# Patient Record
Sex: FEMALE | Race: WHITE
[De-identification: names, ages, dates, MRNs, and addresses within clinical notes are randomized per-mention and may not be internally consistent; named-entity substitution may affect disease eponyms.]

---

## 2019-07-23 ENCOUNTER — HOSPITAL ENCOUNTER (EMERGENCY)
Dept: HOSPITAL 62 - ER | Age: 19
LOS: 1 days | Discharge: LEFT BEFORE DISCHARGE | End: 2019-07-24
Payer: SELF-PAY

## 2019-07-23 VITALS — DIASTOLIC BLOOD PRESSURE: 81 MMHG | SYSTOLIC BLOOD PRESSURE: 136 MMHG

## 2019-07-23 DIAGNOSIS — Z53.21: Primary | ICD-10-CM

## 2019-07-25 ENCOUNTER — HOSPITAL ENCOUNTER (EMERGENCY)
Dept: HOSPITAL 62 - ER | Age: 19
Discharge: HOME | End: 2019-07-25
Payer: MEDICAID

## 2019-07-25 VITALS — SYSTOLIC BLOOD PRESSURE: 119 MMHG | DIASTOLIC BLOOD PRESSURE: 82 MMHG

## 2019-07-25 DIAGNOSIS — R42: ICD-10-CM

## 2019-07-25 DIAGNOSIS — R10.2: ICD-10-CM

## 2019-07-25 DIAGNOSIS — R11.2: ICD-10-CM

## 2019-07-25 DIAGNOSIS — A74.9: ICD-10-CM

## 2019-07-25 DIAGNOSIS — R10.30: ICD-10-CM

## 2019-07-25 DIAGNOSIS — N83.202: Primary | ICD-10-CM

## 2019-07-25 LAB
ADD MANUAL DIFF: NO
ALBUMIN SERPL-MCNC: 4.2 G/DL (ref 3.5–5)
ALP SERPL-CCNC: 103 U/L (ref 38–126)
ALT SERPL-CCNC: 17 U/L (ref 9–52)
ANION GAP SERPL CALC-SCNC: 10 MMOL/L (ref 5–19)
APPEARANCE UR: CLEAR
APTT PPP: YELLOW S
AST SERPL-CCNC: 22 U/L (ref 14–36)
BACTERIA (WET MOUNT): (no result)
BASOPHILS # BLD AUTO: 0 10^3/UL (ref 0–0.2)
BASOPHILS NFR BLD AUTO: 0.6 % (ref 0–2)
BILIRUB DIRECT SERPL-MCNC: 0.2 MG/DL (ref 0–0.4)
BILIRUB SERPL-MCNC: 0.4 MG/DL (ref 0.2–1.3)
BILIRUB UR QL STRIP: NEGATIVE
BUN SERPL-MCNC: 10 MG/DL (ref 7–20)
CALCIUM: 9.5 MG/DL (ref 8.4–10.2)
CHLAM PCR: DETECTED
CHLORIDE SERPL-SCNC: 104 MMOL/L (ref 98–107)
CO2 SERPL-SCNC: 25 MMOL/L (ref 22–30)
EOSINOPHIL # BLD AUTO: 0.2 10^3/UL (ref 0–0.6)
EOSINOPHIL NFR BLD AUTO: 2.3 % (ref 0–6)
ERYTHROCYTE [DISTWIDTH] IN BLOOD BY AUTOMATED COUNT: 13.7 % (ref 11.5–14)
GLUCOSE SERPL-MCNC: 83 MG/DL (ref 75–110)
GLUCOSE UR STRIP-MCNC: NEGATIVE MG/DL
HCT VFR BLD CALC: 40.7 % (ref 36–47)
HGB BLD-MCNC: 13.9 G/DL (ref 12–15.5)
KETONES UR STRIP-MCNC: NEGATIVE MG/DL
LYMPHOCYTES # BLD AUTO: 1.7 10^3/UL (ref 0.5–4.7)
LYMPHOCYTES NFR BLD AUTO: 20.3 % (ref 13–45)
MCH RBC QN AUTO: 29.4 PG (ref 27–33.4)
MCHC RBC AUTO-ENTMCNC: 34.1 G/DL (ref 32–36)
MCV RBC AUTO: 86 FL (ref 80–97)
MONOCYTES # BLD AUTO: 0.5 10^3/UL (ref 0.1–1.4)
MONOCYTES NFR BLD AUTO: 5.9 % (ref 3–13)
NEUTROPHILS # BLD AUTO: 6.1 10^3/UL (ref 1.7–8.2)
NEUTS SEG NFR BLD AUTO: 70.9 % (ref 42–78)
NITRITE UR QL STRIP: NEGATIVE
PH UR STRIP: 7 [PH] (ref 5–9)
PLATELET # BLD: 273 10^3/UL (ref 150–450)
POTASSIUM SERPL-SCNC: 4.3 MMOL/L (ref 3.6–5)
PROT SERPL-MCNC: 7.7 G/DL (ref 6.3–8.2)
PROT UR STRIP-MCNC: NEGATIVE MG/DL
RBC # BLD AUTO: 4.71 10^6/UL (ref 3.72–5.28)
RBCS (WET MOUNT): (no result)
SP GR UR STRIP: 1.01
T.VAGINALIS (WET MOUNT): (no result)
TOTAL CELLS COUNTED % (AUTO): 100 %
UROBILINOGEN UR-MCNC: NEGATIVE MG/DL (ref ?–2)
WBC # BLD AUTO: 8.5 10^3/UL (ref 4–10.5)
WBCS (WET MOUNT): (no result)
YEAST (WET MOUNT): (no result)

## 2019-07-25 PROCEDURE — 85025 COMPLETE CBC W/AUTO DIFF WBC: CPT

## 2019-07-25 PROCEDURE — 99284 EMERGENCY DEPT VISIT MOD MDM: CPT

## 2019-07-25 PROCEDURE — 96372 THER/PROPH/DIAG INJ SC/IM: CPT

## 2019-07-25 PROCEDURE — 87591 N.GONORRHOEAE DNA AMP PROB: CPT

## 2019-07-25 PROCEDURE — 36415 COLL VENOUS BLD VENIPUNCTURE: CPT

## 2019-07-25 PROCEDURE — 81001 URINALYSIS AUTO W/SCOPE: CPT

## 2019-07-25 PROCEDURE — 93976 VASCULAR STUDY: CPT

## 2019-07-25 PROCEDURE — 84702 CHORIONIC GONADOTROPIN TEST: CPT

## 2019-07-25 PROCEDURE — 83690 ASSAY OF LIPASE: CPT

## 2019-07-25 PROCEDURE — 80053 COMPREHEN METABOLIC PANEL: CPT

## 2019-07-25 PROCEDURE — 87491 CHLMYD TRACH DNA AMP PROBE: CPT

## 2019-07-25 PROCEDURE — 76830 TRANSVAGINAL US NON-OB: CPT

## 2019-07-25 PROCEDURE — 87210 SMEAR WET MOUNT SALINE/INK: CPT

## 2019-07-25 NOTE — RADIOLOGY REPORT (SQ)
EXAM DESCRIPTION:  U/S NON OB PEL TV W/DOPPLER



COMPLETED DATE/TIME:  7/25/2019 3:54 pm



REASON FOR STUDY:  pelvic pain



COMPARISON:  None.



TECHNIQUE:  Dynamic and static grayscale images acquired of the pelvis via transvaginal approach and 
recorded on PACS. Additional selected color Doppler and spectral images recorded.



LIMITATIONS:  None.



FINDINGS:  UTERUS: Contour normal.  No mass.

ENDOMETRIAL STRIPE: No focal or generalized thickening. No masses.

CERVIX: No nabothian cysts.

RIGHT OVARY AND DOPPLER: Normal size. No worrisome masses. Normal arterial vascular flow without evid
ence for torsion.

LEFT OVARY AND DOPPLER: Normal size.  Simple cyst measuring 2 cm.  No worrisome masses. Normal arteri
al vascular flow without evidence for torsion.

FREE FLUID: None noted.

OTHER: No other significant finding.

MEASUREMENTS:

UTERUS: 3 x 4.3 x 5.9 cm.

ENDOMETRIAL STRIPE: 4.6 mm.

RIGHT OVARY: 2.1 x 2.5 x 2.5 cm.

LEFT OVARY: 2.5 x 3 x 3.6 cm.



IMPRESSION:  2 CM LEFT OVARIAN CYST.  OTHERWISE NORMAL TRANSVAGINAL PELVIC ULTRASOUND.



TECHNICAL DOCUMENTATION:  JOB ID:  [id number]

 2011 [employer]- All Rights Reserved                          [id number]



Reading location - IP/workstation name: [email or web address]

## 2019-07-25 NOTE — ER DOCUMENT REPORT
ED Medical Screen (RME)





- General


Chief Complaint: Abdominal Pain


Stated Complaint: ABDOMINAL PAIN


Time Seen by Provider: 07/25/19 12:53


Notes: 





Patient is otherwise healthy 19-year-old female presents to the emergency 

department for generalized abdominal pain.  Patient states she is abdominal pain

for "quite some time now."  Patient states she is unsure of when her last 

menstrual cycle was.  States she feels as it may have been 4 months ago.  

Patient denies taking any at home pregnancy tests.  Patient's denying any 

dysuria or vaginal bleeding at this time.





Patient's admitting to generalized nausea.





GENERAL: Alert, interacts well. No acute distress.


ABDOMEN: Soft, generalized tenderness noted all 4 quadrants.  





I have greeted and performed a rapid initial assessment of this patient.  A 

comprehensive ED assessment and evaluation of the patient, analysis of test 

results and completion of the medical decision making process will be conducted 

by additional ED providers.





I have specifically instructed the patient or family members with the patient to

immediately return to any nursing staff should anything change in the patient's 

condition or with their chief complaint.





This medical record was dictated with voice recognizing software.  There may be 

grammatical, syntax errors that are unintended.


TRAVEL OUTSIDE OF THE [place] IN LAST 30 DAYS: No





- Related Data


Allergies/Adverse Reactions: 


                                        





clonidine Allergy (Verified 07/25/19 12:24)


   











Physical Exam





- Vital signs


Vitals: 





                                        











Temp Pulse Resp BP Pulse Ox


 


 98.3 F   89   16   123/77   99 


 


 07/25/19 12:37  07/25/19 12:37  07/25/19 12:37  07/25/19 12:37  07/25/19 12:37














Course





- Vital Signs


Vital signs: 





                                        











Temp Pulse Resp BP Pulse Ox


 


 98.3 F   89   16   123/77   99 


 


 07/25/19 12:37  07/25/19 12:37  07/25/19 12:37  07/25/19 12:37  07/25/19 12:37

## 2019-07-25 NOTE — ER DOCUMENT REPORT
ED General





- General


Chief Complaint: Abdominal Pain


Stated Complaint: ABDOMINAL PAIN


Time Seen by Provider: 07/25/19 12:53


TRAVEL OUTSIDE OF THE [place] IN LAST 30 DAYS: No





- HPI


Notes: 





Patient is a 19-year-old female no significant past medical history or surgical 

history to her abdomen who presents complaining of lower abdominal discomfort is

described as a dull pain that is been present for about 1.5 weeks.  Patient 

states that she has had intermittent nausea, vomiting, and dizziness over the 

past few weeks as well.  Last menstrual period was 4 months ago.  She is able to

tolerate p.o. with the occ n/v.  She has not noticed any vaginal discharge, 

odor, or bleeding.  She is having normal bowel movements and urinating normally.

 Patient states that on occasion her pain will become generalized.  Denies any 

headache, fever, neck pain, URI, sore throat, chest pain, palpitations, syncope,

cough, shortness of breath, wheeze, dyspnea, diarrhea, urinary retention, 

dysuria, hematuria, back pain, or rash.





- Related Data


Allergies/Adverse Reactions: 


                                        





clonidine Allergy (Verified 07/25/19 12:24)


   











Past Medical History





- Social History


Smoking Status: Unknown if Ever Smoked


Family History: Reviewed & Not Pertinent





Review of Systems





- Review of Systems


-: Yes All other systems reviewed and negative





Physical Exam





- Vital signs


Vitals: 


                                        











Temp Pulse Resp BP Pulse Ox


 


 98.3 F   89   16   123/77   99 


 


 07/25/19 12:37  07/25/19 12:37  07/25/19 12:37  07/25/19 12:37  07/25/19 12:37














- Notes


Notes: 





PHYSICAL EXAMINATION:  Accompanied by Heather claire.





GENERAL: Well-appearing, well-nourished and in no acute distress.





Eyes:  anicteric.





LUNGS: Breath sounds clear to auscultation bilaterally and equal.  No wheezes 

rales or rhonchi.





HEART: Regular rate and rhythm without murmurs





ABDOMEN: Soft, nondistended abdomen.  No guarding, no rebound.  Normal bowel 

sounds present.  CVA tenderness negative bilaterally.  + very mild tenderness 

lower pelvic/suprapubic area, more so midline.  Guzman neg.  No tenderness at 

[place].





Female : No inguinal adenopathy.  External genitalia without erythema, 

lesions, or masses.  Vaginal mucosa pink with scant white discharge.  Cervix 

parous, pink, and without discharge.  Uterus is smooth.  No adnexal tenderness. 

No obvious hernia.  No CMT.





Extremities:  No cyanosis/clubbing/edema b/l.  Peripheral pulses 2+.  Capillary 

refill less than 3 seconds.





NEUROLOGICAL:  Normal speech, normal gait. 





PSYCH: Normal mood, normal affect.





SKIN: Warm, Dry, normal turgor, no rashes or lesions noted.





Course





- Re-evaluation


Re-evalutation: 





07/25/19 16:23


Patient is an afebrile, well-hydrated, 19-year-old female who presents with 

lower pelvic pain with noted ovarian cyst on the left side as well as having 

chlamydia.  Vitals are acceptable without significant tachycardia, tachypnea, or

hypoxia.  PE is otherwise patient has no abdominal, rebound/guarding.  She is n

ontoxic-appearing and is able to tolerate p.o. without difficulty.  No obvious 

cervical motion tenderness on exam.  Patient received Zithromax as well as 

Rocephin.  Lab work is otherwise unremarkable.  See ultrasound.  No further 

work-up warranted.  Low suspicion/risk for acute appendicitis, bowel o

bstruction, acute cholecystitis, acute cholangitis, perforated diverticulitis, 

incarcerated hernia, pancreatitis, perforated ulcer, peritonitis, sepsis, severe

pelvic inflammatory disease, ectopic pregnancy, tubo-ovarian abscess, ovarian 

torsion, or other systemic emergent condition at this time.  Patient is aware 

that her condition can change from initial presentation and she needs to monitor

symptoms closely and seek medical attention if any acute changes.  Even though 

the low suspicion for significant PID, patient is starting to have lower pelvic 

pain so I will send her home with a prescription for doxycycline.  Sunlight 

precautions reviewed.  Conservative measures otherwise for symptoms.  Recheck 

with OBGYN/health department in 3-5 days.  Recheck with your PCM as needed 

otherwise.  Return to the ED with any worsening/concerning symptoms otherwise as

reviewed in discharge.  Patient is in agreement.





- Vital Signs


Vital signs: 


                                        











Temp Pulse Resp BP Pulse Ox


 


 98.3 F   89   16   123/77   99 


 


 07/25/19 12:37  07/25/19 12:37  07/25/19 12:37  07/25/19 12:37  07/25/19 12:37














- Laboratory


Result Diagrams: 


                                 07/25/19 13:10





                                 07/25/19 13:10


Laboratory results interpreted by me: 


                                        











  07/25/19 07/25/19





  13:10 14:19


 


Ur Leukocyte Esterase  TRACE H 


 


Chlamydia DNA (PCR)   DETECTED H














Procedures





- Pelvic Exam


  ** Pelvic exam


Cultures obtained: Yes


Wet prep obtained: Yes


Bimanual exam performed: Yes - negative


Witnessed by: heather claire





Discharge





- Discharge


Clinical Impression: 


 Chlamydia, Pelvic pain





Ovarian cyst


Qualifiers:


 Laterality: left Qualified Code(s): N83.202 - Unspecified ovarian cyst, left 

side





Condition: Stable


Disposition: HOME, SELF-CARE


Instructions:  Chlamydia ([facility]), Doxycycline ([facility])


Additional Instructions: 


Maintain fluid intake


Proper hygienic technique


Keep the skin clean


Safe sexual practices with condoms everytime


Tylenol/ibuprofen as needed


Check in with the health department in 3 to 5 days


F/u with your PCM/OBGYN in 3-5 days for a recheck


Return to the ED with any development of HA/fever, trouble with vision, eye 

redness, worsening pain, urethral discharge, urinary retention, blood in the 

urine, flank pain, abdominal pain, n/v, Chest Pain, shortness of breath, joint 

pains, trouble breathing, or any other worsening/concerning symptoms as needed 

otherwise.


Prescriptions: 


Doxycycline Hyclate 100 mg PO BID #20 capsule


Referrals: 


HEALTH DEPT,[facility] [NO LOCAL MD] - Follow up in 3-5 days

## 2019-08-08 ENCOUNTER — HOSPITAL ENCOUNTER (EMERGENCY)
Dept: HOSPITAL 62 - ER | Age: 19
Discharge: HOME | End: 2019-08-08
Payer: MEDICAID

## 2019-08-08 VITALS — DIASTOLIC BLOOD PRESSURE: 86 MMHG | SYSTOLIC BLOOD PRESSURE: 130 MMHG

## 2019-08-08 DIAGNOSIS — R35.0: ICD-10-CM

## 2019-08-08 DIAGNOSIS — R42: ICD-10-CM

## 2019-08-08 DIAGNOSIS — R10.9: ICD-10-CM

## 2019-08-08 DIAGNOSIS — J45.909: ICD-10-CM

## 2019-08-08 DIAGNOSIS — K29.00: ICD-10-CM

## 2019-08-08 DIAGNOSIS — H65.03: Primary | ICD-10-CM

## 2019-08-08 DIAGNOSIS — R10.12: ICD-10-CM

## 2019-08-08 DIAGNOSIS — H53.8: ICD-10-CM

## 2019-08-08 DIAGNOSIS — N39.0: ICD-10-CM

## 2019-08-08 DIAGNOSIS — R07.81: ICD-10-CM

## 2019-08-08 LAB
ADD MANUAL DIFF: NO
ADD MANUAL MICROSCOPIC: YES
ALBUMIN SERPL-MCNC: 4.8 G/DL (ref 3.5–5)
ALP SERPL-CCNC: 85 U/L (ref 38–126)
ANION GAP SERPL CALC-SCNC: 8 MMOL/L (ref 5–19)
APPEARANCE UR: (no result)
APTT PPP: YELLOW S
AST SERPL-CCNC: 67 U/L (ref 14–36)
BACTERIA #/AREA URNS HPF: (no result) /HPF
BASOPHILS # BLD AUTO: 0 10^3/UL (ref 0–0.2)
BASOPHILS NFR BLD AUTO: 0.4 % (ref 0–2)
BILIRUB DIRECT SERPL-MCNC: 0.2 MG/DL (ref 0–0.4)
BILIRUB SERPL-MCNC: 0.4 MG/DL (ref 0.2–1.3)
BILIRUB UR QL STRIP: NEGATIVE
BUN SERPL-MCNC: 12 MG/DL (ref 7–20)
CALCIUM: 9.9 MG/DL (ref 8.4–10.2)
CHLAM PCR: NOT DETECTED
CHLORIDE SERPL-SCNC: 101 MMOL/L (ref 98–107)
CO2 SERPL-SCNC: 31 MMOL/L (ref 22–30)
EOSINOPHIL # BLD AUTO: 0.2 10^3/UL (ref 0–0.6)
EOSINOPHIL NFR BLD AUTO: 2.7 % (ref 0–6)
ERYTHROCYTE [DISTWIDTH] IN BLOOD BY AUTOMATED COUNT: 14 % (ref 11.5–14)
GLUCOSE SERPL-MCNC: 76 MG/DL (ref 75–110)
GLUCOSE UR STRIP-MCNC: NEGATIVE MG/DL
HCT VFR BLD CALC: 42.1 % (ref 36–47)
HGB BLD-MCNC: 14.1 G/DL (ref 12–15.5)
KETONES UR STRIP-MCNC: NEGATIVE MG/DL
LYMPHOCYTES # BLD AUTO: 1.8 10^3/UL (ref 0.5–4.7)
LYMPHOCYTES NFR BLD AUTO: 21.6 % (ref 13–45)
MCH RBC QN AUTO: 29.3 PG (ref 27–33.4)
MCHC RBC AUTO-ENTMCNC: 33.5 G/DL (ref 32–36)
MCV RBC AUTO: 88 FL (ref 80–97)
MONOCYTES # BLD AUTO: 0.4 10^3/UL (ref 0.1–1.4)
MONOCYTES NFR BLD AUTO: 5.2 % (ref 3–13)
NEUTROPHILS # BLD AUTO: 5.8 10^3/UL (ref 1.7–8.2)
NEUTS SEG NFR BLD AUTO: 70.1 % (ref 42–78)
NITRITE UR QL STRIP: NEGATIVE
PH UR STRIP: 7 [PH] (ref 5–9)
PLATELET # BLD: 267 10^3/UL (ref 150–450)
POTASSIUM SERPL-SCNC: 4.2 MMOL/L (ref 3.6–5)
PROT SERPL-MCNC: 8.2 G/DL (ref 6.3–8.2)
PROT UR STRIP-MCNC: NEGATIVE MG/DL
RBC # BLD AUTO: 4.81 10^6/UL (ref 3.72–5.28)
SP GR UR STRIP: 1.01
TOTAL CELLS COUNTED % (AUTO): 100 %
UROBILINOGEN UR-MCNC: NEGATIVE MG/DL (ref ?–2)
WBC # BLD AUTO: 8.3 10^3/UL (ref 4–10.5)
WBC #/AREA URNS HPF: (no result) /HPF

## 2019-08-08 PROCEDURE — 87591 N.GONORRHOEAE DNA AMP PROB: CPT

## 2019-08-08 PROCEDURE — 87086 URINE CULTURE/COLONY COUNT: CPT

## 2019-08-08 PROCEDURE — 74177 CT ABD & PELVIS W/CONTRAST: CPT

## 2019-08-08 PROCEDURE — 36415 COLL VENOUS BLD VENIPUNCTURE: CPT

## 2019-08-08 PROCEDURE — 82962 GLUCOSE BLOOD TEST: CPT

## 2019-08-08 PROCEDURE — 84703 CHORIONIC GONADOTROPIN ASSAY: CPT

## 2019-08-08 PROCEDURE — 85025 COMPLETE CBC W/AUTO DIFF WBC: CPT

## 2019-08-08 PROCEDURE — 87491 CHLMYD TRACH DNA AMP PROBE: CPT

## 2019-08-08 PROCEDURE — 74022 RADEX COMPL AQT ABD SERIES: CPT

## 2019-08-08 PROCEDURE — 83690 ASSAY OF LIPASE: CPT

## 2019-08-08 PROCEDURE — 80053 COMPREHEN METABOLIC PANEL: CPT

## 2019-08-08 PROCEDURE — 81001 URINALYSIS AUTO W/SCOPE: CPT

## 2019-08-08 PROCEDURE — 86308 HETEROPHILE ANTIBODY SCREEN: CPT

## 2019-08-08 NOTE — ER DOCUMENT REPORT
ED Medical Screen (RME)





- General


Chief Complaint: Abdominal Cramping


Stated Complaint: ABDOMINAL PAIN


Time Seen by Provider: 08/08/19 10:47


Primary Care Provider: 


[facility] [Provider Group] - Follow up as needed


[facility] [Provider Group] - Follow up as needed


Mode of Arrival: Ambulatory


Information source: Patient


Notes: 





19-year-old female presented to ED for complaint "dizziness, blurred vision, 

coming in and out vision for 1-1/2 hours, cramping in the ribs, and stomach 

pain."  Patient states she is on her menstrual cycle the only medical history 

she has is asthma anxiety depression bipolar ADHD and PTSD.  She denies smoking 

drinking or using any kind of drugs.  She is on her menstrual period at this 

time.  Patient is alert oriented respirations regular and unlabored speaking in 

full sentences walks with a even steady.




















I have greeted and performed a rapid initial assessment of this patient.  A 

comprehensive ED assessment and evaluation of the patient, analysis of test 

results and completion of medical decision making process will be conducted by 

an additional ED providers.











Dictation of this chart was performed using voice recognition software; 

therefore, there may be some unintended grammatical errors.


TRAVEL OUTSIDE OF THE [place] IN LAST 30 DAYS: No





- Related Data


Allergies/Adverse Reactions: 


                                        





clonidine Allergy (Verified 08/08/19 10:20)


   











Past Medical History


Pulmonary Medical History: Reports: Hx Asthma


Renal/ Medical History: Denies: Hx Peritoneal Dialysis





Physical Exam





- Vital signs


Vitals: 


                                        











Temp Pulse Resp BP Pulse Ox


 


 98.4 F   85   16   115/72   100 


 


 08/08/19 10:25  08/08/19 10:25  08/08/19 10:25  08/08/19 10:25  08/08/19 10:25














Course





- Vital Signs


Vital signs: 


                                        











Temp Pulse Resp BP Pulse Ox


 


 97.7 F   77   17   130/86 H  100 


 


 08/08/19 17:27  08/08/19 17:27  08/08/19 17:27  08/08/19 17:27  08/08/19 17:27














- Laboratory


Result Diagrams: 


                                 08/08/19 11:40





                                 08/08/19 11:40


Laboratory results interpreted by me: 


                                        











  08/08/19 08/08/19





  11:06 11:40


 


Carbon Dioxide   31 H


 


AST   67 H


 


Urine Blood  LARGE H 


 


Ur Leukocyte Esterase  TRACE H 














Doctor's Discharge





- Discharge


Clinical Impression: 


 UTI (urinary tract infection), Dizziness, Serous otitis media, Abdominal pain, 

Gastritis





Condition: Stable


Disposition: HOME, SELF-CARE


Instructions:  Abdominal Pain ([facility]), Cephalexin ([facility]), Gastritis ([facility]), Serous 

Otitis Media ([facility]), Urinary Tract Infection ([facility])


Additional Instructions: 


Return immediately for any new or worsening symptoms





Followup with your primary care provider, call tomorrow to make a followup 

appointment





Take Sudafed over-the-counter as well as Zyrtec to help with congestion symptoms

that can cause dizziness


Prescriptions: 


Cephalexin Monohydrate [Keflex 500 mg Capsule] 500 mg PO BID 5 Days  capsule


Omeprazole Magnesium [Prilosec Otc] 20 mg PO DAILY #15 tablet.dr


Forms:  Return to Work


Referrals: 


[facility] [Provider Group] - Follow up as needed


[facility] [Provider Group] - Follow up as needed

## 2019-08-08 NOTE — ER DOCUMENT REPORT
ED General





- General


Chief Complaint: Abdominal Cramping


Stated Complaint: ABDOMINAL PAIN


Time Seen by Provider: 08/08/19 10:47


Primary Care Provider: 


[facility] - Follow up as needed


[facility] [Provider Group] - Follow up as needed


Mode of Arrival: Ambulatory


Notes: 





Patient presents complaining of dizziness that started just prior to arrival 

with some blurred vision.  Patient states that she is supposed to wear glasses 

although not been wearing any recently.  Patient also complains of left upper 

quadrant abdominal pain and rib pain.  Patient denies any cough or injury.  

Patient denies any nausea vomiting or diarrhea.  Patient denies any fever.  

Patient does report some urinary frequency.


TRAVEL OUTSIDE OF THE [place] IN LAST 30 DAYS: No





- HPI


Onset: This afternoon


Onset/Duration: Gradual


Pain Level: 3


Associated symptoms: Other - Left rib pain, left upper quadrant pain.  denies: 

Chest pain, Nonproductive cough, Productive cough, Diarrhea, Fever, Headache, 

Nausea, Vomiting


Exacerbated by: Denies


Relieved by: Denies


Similar symptoms previously: No


Recently seen / treated by doctor: Yes





- Related Data


Allergies/Adverse Reactions: 


                                        





clonidine Allergy (Verified 08/08/19 10:20)


   











Past Medical History





- General


Information source: Patient





- Social History


Smoking Status: Never Smoker


Frequency of alcohol use: None


Drug Abuse: None


Occupation: Exotic dancer


Family History: Reviewed & Not Pertinent


Patient has suicidal ideation: No


Patient has homicidal ideation: No


Pulmonary Medical History: Reports: Hx Asthma


Renal/ Medical History: Denies: Hx Peritoneal Dialysis


Psychiatric Medical History: Reports: Hx Attention Deficit Hyperactivity 

Disorder, Hx Bipolar Disorder, Hx Depression


Surgical Hx: Negative





Review of Systems





- Review of Systems


Constitutional: No symptoms reported.  denies: Fever, Recent illness


EENT: No symptoms reported


Cardiovascular: Chest pain - Left lateral rib pain, Dizziness


Respiratory: No symptoms reported.  denies: Cough, Hurts to breathe, Short of 

breath, Sputum


Gastrointestinal: Abdominal pain.  denies: Diarrhea, Nausea, Vomiting


Genitourinary: Frequency.  denies: Dysuria, Flank pain, Hematuria


Female Genitourinary: No symptoms reported


Musculoskeletal: No symptoms reported.  denies: Back pain


Skin: No symptoms reported


Hematologic/Lymphatic: No symptoms reported


Neurological/Psychological: No symptoms reported.  denies: Weakness, Headaches





Physical Exam





- Vital signs


Vitals: 


                                        











Temp Pulse Resp BP Pulse Ox


 


 98.4 F   85   16   115/72   100 


 


 08/08/19 10:25  08/08/19 10:25  08/08/19 10:25  08/08/19 10:25  08/08/19 10:25














- HEENT


Head: Normocephalic


Eyes: Normal


Conjunctiva: Normal


Visual acuity- Right eye: 20/50


Visual acuity- Left eye: 20/50


Visual acuity- Both eyes: 20/40


Corrective lenses worn: No


Ears: Normal


External canal: Normal


Tympanic membrane: Serous effusion


Nasal: Normal


Mouth/Lips: Normal


Mucous membranes: Normal


Neck: Normal, Supple.  No: Lymphadenopathy





- Respiratory


Respiratory status: No respiratory distress


Chest status: Nontender


Breath sounds: Normal.  No: Rales, Rhonchi, Stridor, Wheezing


Chest palpation: Normal





- Cardiovascular


Rhythm: Regular


Heart sounds: S1 appreciated, S2 appreciated


Murmur: No





- Abdominal


Inspection: Normal


Distension: No distension


Bowel sounds: Normal


Tenderness: Tender - epig,LUQ


Organomegaly: No organomegaly





- Back


Back: Normal, Nontender.  No: CVA tenderness





- Extremities


General upper extremity: Normal inspection, Nontender, Normal strength


General lower extremity: Normal inspection, Nontender, Normal strength





- Neurological


Neuro grossly intact: Yes


Cognition: Normal


Paz Coma Scale Eye Opening: Spontaneous


[place] Coma Scale Verbal: Oriented


[place] Coma Scale Motor: Obeys Commands


Paz Coma Scale Total: 15





- Psychological


Associated symptoms: Normal affect, Normal mood





- Skin


Skin Temperature: Warm


Skin Moisture: Dry


Skin Color: Normal





Course





- Re-evaluation


Re-evalutation: 





08/08/19 16:43


Patient CT scan report reviewed, no concern for any hepatosplenomegaly.  No 

other acute findings noted on scan to explain patient's epigastric and left 

upper quadrant abdominal pain.





- Vital Signs


Vital signs: 


                                        











Temp Pulse Resp BP Pulse Ox


 


 98.4 F   85   16   115/72   100 


 


 08/08/19 10:25  08/08/19 10:25  08/08/19 10:25  08/08/19 10:25  08/08/19 10:25














- Laboratory


Result Diagrams: 


                                 08/08/19 11:40





                                 08/08/19 11:40


Laboratory results interpreted by me: 


                                        











  08/08/19 08/08/19





  11:06 11:40


 


Carbon Dioxide   31 H


 


AST   67 H


 


Urine Blood  LARGE H 


 


Ur Leukocyte Esterase  TRACE H 











08/08/19 16:39





                               Labs- Entire Visit











  08/08/19 08/08/19 08/08/19





  11:06 11:20 11:40


 


WBC    8.3


 


RBC    4.81


 


Hgb    14.1


 


Hct    42.1


 


MCV    88


 


MCH    29.3


 


MCHC    33.5


 


RDW    14.0


 


Plt Count    267


 


Seg Neutrophils %    70.1


 


Lymphocytes %    21.6


 


Monocytes %    5.2


 


Eosinophils %    2.7


 


Basophils %    0.4


 


Absolute Neutrophils    5.8


 


Absolute Lymphocytes    1.8


 


Absolute Monocytes    0.4


 


Absolute Eosinophils    0.2


 


Absolute Basophils    0.0


 


Sodium   


 


Potassium   


 


Chloride   


 


Carbon Dioxide   


 


Anion Gap   


 


BUN   


 


Creatinine   


 


Est GFR ( Amer)   


 


Est GFR (Non-Af Amer)   


 


Glucose   


 


POC Glucose   91 


 


Calcium   


 


Total Bilirubin   


 


Direct Bilirubin   


 


Neonat Total Bilirubin   


 


Neonat Direct Bilirubin   


 


Neonat Indirect Bili   


 


AST   


 


ALT   


 


Alkaline Phosphatase   


 


Total Protein   


 


Albumin   


 


Lipase   


 


Serum HCG, Qual   


 


Urine Color  YELLOW  


 


Urine Appearance  SLIGHTLY-CLOUDY  


 


Urine pH  7.0  


 


Ur Specific Gravity  1.013  


 


Urine Protein  NEGATIVE  


 


Urine Glucose (UA)  NEGATIVE  


 


Urine Ketones  NEGATIVE  


 


Urine Blood  LARGE H  


 


Urine Nitrite  NEGATIVE  


 


Urine Bilirubin  NEGATIVE  


 


Urine Urobilinogen  NEGATIVE  


 


Ur Leukocyte Esterase  TRACE H  


 


Urine RBC  10-20  


 


Urine WBC  1-5  


 


Ur Squamous Epith Cells  MODERATE  


 


Urine Bacteria  TRACE  


 


Urine Ascorbic Acid  NEGATIVE  


 


Chlamydia DNA (PCR)   


 


Monotest   


 


N.gonorrhoeae DNA (PCR)   














  08/08/19 08/08/19 08/08/19





  11:40 11:40 11:40


 


WBC   


 


RBC   


 


Hgb   


 


Hct   


 


MCV   


 


MCH   


 


MCHC   


 


RDW   


 


Plt Count   


 


Seg Neutrophils %   


 


Lymphocytes %   


 


Monocytes %   


 


Eosinophils %   


 


Basophils %   


 


Absolute Neutrophils   


 


Absolute Lymphocytes   


 


Absolute Monocytes   


 


Absolute Eosinophils   


 


Absolute Basophils   


 


Sodium  140.4  


 


Potassium  4.2  


 


Chloride  101  


 


Carbon Dioxide  31 H  


 


Anion Gap  8  


 


BUN  12  


 


Creatinine  0.90  


 


Est GFR ( Amer)  > 60  


 


Est GFR (Non-Af Amer)  > 60  


 


Glucose  76  


 


POC Glucose   


 


Calcium  9.9  


 


Total Bilirubin  0.4  


 


Direct Bilirubin  0.2  


 


Neonat Total Bilirubin  Not Reportable  


 


Neonat Direct Bilirubin  Not Reportable  


 


Neonat Indirect Bili  Not Reportable  


 


AST  67 H  


 


ALT  37  


 


Alkaline Phosphatase  85  


 


Total Protein  8.2  


 


Albumin  4.8  


 


Lipase  104.5  


 


Serum HCG, Qual   NEGATIVE 


 


Urine Color   


 


Urine Appearance   


 


Urine pH   


 


Ur Specific Gravity   


 


Urine Protein   


 


Urine Glucose (UA)   


 


Urine Ketones   


 


Urine Blood   


 


Urine Nitrite   


 


Urine Bilirubin   


 


Urine Urobilinogen   


 


Ur Leukocyte Esterase   


 


Urine RBC   


 


Urine WBC   


 


Ur Squamous Epith Cells   


 


Urine Bacteria   


 


Urine Ascorbic Acid   


 


Chlamydia DNA (PCR)   


 


Monotest    NEGATIVE


 


N.gonorrhoeae DNA (PCR)   














  08/08/19





  14:45


 


WBC 


 


RBC 


 


Hgb 


 


Hct 


 


MCV 


 


MCH 


 


MCHC 


 


RDW 


 


Plt Count 


 


Seg Neutrophils % 


 


Lymphocytes % 


 


Monocytes % 


 


Eosinophils % 


 


Basophils % 


 


Absolute Neutrophils 


 


Absolute Lymphocytes 


 


Absolute Monocytes 


 


Absolute Eosinophils 


 


Absolute Basophils 


 


Sodium 


 


Potassium 


 


Chloride 


 


Carbon Dioxide 


 


Anion Gap 


 


BUN 


 


Creatinine 


 


Est GFR ( Amer) 


 


Est GFR (Non-Af Amer) 


 


Glucose 


 


POC Glucose 


 


Calcium 


 


Total Bilirubin 


 


Direct Bilirubin 


 


Neonat Total Bilirubin 


 


Neonat Direct Bilirubin 


 


Neonat Indirect Bili 


 


AST 


 


ALT 


 


Alkaline Phosphatase 


 


Total Protein 


 


Albumin 


 


Lipase 


 


Serum HCG, Qual 


 


Urine Color 


 


Urine Appearance 


 


Urine pH 


 


Ur Specific Gravity 


 


Urine Protein 


 


Urine Glucose (UA) 


 


Urine Ketones 


 


Urine Blood 


 


Urine Nitrite 


 


Urine Bilirubin 


 


Urine Urobilinogen 


 


Ur Leukocyte Esterase 


 


Urine RBC 


 


Urine WBC 


 


Ur Squamous Epith Cells 


 


Urine Bacteria 


 


Urine Ascorbic Acid 


 


Chlamydia DNA (PCR)  NOT DETECTED


 


Monotest 


 


N.gonorrhoeae DNA (PCR)  NOT DETECTED














- Diagnostic Test


Radiology reviewed: Reports reviewed





Discharge





- Discharge


Clinical Impression: 


 Dizziness





UTI (urinary tract infection)


Qualifiers:


 Urinary tract infection type: site unspecified Hematuria presence: with 

hematuria Qualified Code(s): N39.0 - Urinary tract infection, site not specified





Serous otitis media


Qualifiers:


 Chronicity: acute Laterality: bilateral Recurrence: not specified as recurrent 

Qualified Code(s): H65.03 - Acute serous otitis media, bilateral





Abdominal pain


Qualifiers:


 Abdominal location: left upper quadrant Qualified Code(s): R10.12 - Left upper 

quadrant pain





Gastritis


Qualifiers:


 Gastritis type: unspecified gastritis Chronicity: acute Gastritis bleeding: 

without bleeding Qualified Code(s): K29.00 - Acute gastritis without bleeding





Condition: Stable


Disposition: HOME, SELF-CARE


Instructions:  Abdominal Pain ([facility]), Cephalexin ([facility]), Gastritis ([facility]), Serous 

Otitis Media ([facility]), Urinary Tract Infection ([facility])


Additional Instructions: 


Return immediately for any new or worsening symptoms





Followup with your primary care provider, call tomorrow to make a followup 

appointment





Take Sudafed over-the-counter as well as Zyrtec to help with congestion symptoms

that can cause dizziness


Prescriptions: 


Cephalexin Monohydrate [Keflex 500 mg Capsule] 500 mg PO BID 5 Days  capsule


Omeprazole Magnesium [Prilosec Otc] 20 mg PO DAILY #15 tablet.dr


Forms:  Return to Work


Referrals: 


[facility] [Provider Group] - Follow up as needed


[facility] [Provider Group] - Follow up as needed

## 2019-08-08 NOTE — RADIOLOGY REPORT (SQ)
EXAM DESCRIPTION:  ACUTE ABDOMEN SERIES



COMPLETED DATE/TIME:  8/8/2019 3:04 pm



REASON FOR STUDY:  LUQ pain



COMPARISON:  None.



NUMBER OF VIEWS:  Three views.



TECHNIQUE:  Frontal chest, supine abdomen and upright/decubitus abdomen radiographic images acquired.




LIMITATIONS:  None.



FINDINGS:  CHEST: Lungs clear of infiltrates.

FREE AIR: None. No abnormal gas collections.

BOWEL GAS PATTERN: Nonobstructive pattern. No dilated loops or air fluid levels.

CALCIFICATIONS: No suspicious calcifications.

HARDWARE: None in the abdomen.

SOFT TISSUES: The transverse colon is low-lying in the abdomen and the liver is mildly enlarged, teri
uring 21 cm in coronal span.

BONES: No acute fracture.  No worrisome bone lesions.

OTHER: No other significant finding.



IMPRESSION:  1.  Nonobstructive pattern of bowel gas with gas present to the descending colon.  No la
rge burden of stool in the colon.  No free air in the abdomen.

2.  Mild hepatomegaly.  The spleen is at the upper limit of normal in projected size.  Consider ultra
sound or CT to further evaluate in the setting of upper abdominal pain.

3.  No acute abnormality of the lungs.



TECHNICAL DOCUMENTATION:  JOB ID:  [id number]

 2011 [employer]- All Rights Reserved



Reading location - IP/workstation name: [email or web address]

## 2019-09-10 ENCOUNTER — HOSPITAL ENCOUNTER (EMERGENCY)
Dept: HOSPITAL 62 - ER | Age: 19
Discharge: HOME | End: 2019-09-10
Payer: MEDICAID

## 2019-09-10 VITALS — DIASTOLIC BLOOD PRESSURE: 73 MMHG | SYSTOLIC BLOOD PRESSURE: 107 MMHG

## 2019-09-10 DIAGNOSIS — R10.816: ICD-10-CM

## 2019-09-10 DIAGNOSIS — R10.812: ICD-10-CM

## 2019-09-10 DIAGNOSIS — Z88.8: ICD-10-CM

## 2019-09-10 DIAGNOSIS — R10.12: Primary | ICD-10-CM

## 2019-09-10 DIAGNOSIS — J45.909: ICD-10-CM

## 2019-09-10 LAB
ADD MANUAL DIFF: NO
ALBUMIN SERPL-MCNC: 4.4 G/DL (ref 3.5–5)
ALP SERPL-CCNC: 75 U/L (ref 38–126)
ANION GAP SERPL CALC-SCNC: 9 MMOL/L (ref 5–19)
APPEARANCE UR: (no result)
APTT PPP: YELLOW S
AST SERPL-CCNC: 25 U/L (ref 14–36)
BASOPHILS # BLD AUTO: 0 10^3/UL (ref 0–0.2)
BASOPHILS NFR BLD AUTO: 0.4 % (ref 0–2)
BILIRUB DIRECT SERPL-MCNC: 0 MG/DL (ref 0–0.4)
BILIRUB SERPL-MCNC: 0.5 MG/DL (ref 0.2–1.3)
BILIRUB UR QL STRIP: NEGATIVE
BUN SERPL-MCNC: 16 MG/DL (ref 7–20)
CALCIUM: 9.4 MG/DL (ref 8.4–10.2)
CHLORIDE SERPL-SCNC: 102 MMOL/L (ref 98–107)
CO2 SERPL-SCNC: 28 MMOL/L (ref 22–30)
EOSINOPHIL # BLD AUTO: 0.3 10^3/UL (ref 0–0.6)
EOSINOPHIL NFR BLD AUTO: 2.8 % (ref 0–6)
ERYTHROCYTE [DISTWIDTH] IN BLOOD BY AUTOMATED COUNT: 13.7 % (ref 11.5–14)
GLUCOSE SERPL-MCNC: 79 MG/DL (ref 75–110)
GLUCOSE UR STRIP-MCNC: NEGATIVE MG/DL
HCT VFR BLD CALC: 39.7 % (ref 36–47)
HGB BLD-MCNC: 13.4 G/DL (ref 12–15.5)
KETONES UR STRIP-MCNC: NEGATIVE MG/DL
LYMPHOCYTES # BLD AUTO: 2.7 10^3/UL (ref 0.5–4.7)
LYMPHOCYTES NFR BLD AUTO: 27.9 % (ref 13–45)
MCH RBC QN AUTO: 29.8 PG (ref 27–33.4)
MCHC RBC AUTO-ENTMCNC: 33.9 G/DL (ref 32–36)
MCV RBC AUTO: 88 FL (ref 80–97)
MONOCYTES # BLD AUTO: 0.7 10^3/UL (ref 0.1–1.4)
MONOCYTES NFR BLD AUTO: 7.4 % (ref 3–13)
NEUTROPHILS # BLD AUTO: 5.9 10^3/UL (ref 1.7–8.2)
NEUTS SEG NFR BLD AUTO: 61.5 % (ref 42–78)
NITRITE UR QL STRIP: NEGATIVE
PH UR STRIP: 7 [PH] (ref 5–9)
PLATELET # BLD: 246 10^3/UL (ref 150–450)
POTASSIUM SERPL-SCNC: 4.2 MMOL/L (ref 3.6–5)
PROT SERPL-MCNC: 7.4 G/DL (ref 6.3–8.2)
PROT UR STRIP-MCNC: NEGATIVE MG/DL
RBC # BLD AUTO: 4.52 10^6/UL (ref 3.72–5.28)
SP GR UR STRIP: 1.02
TOTAL CELLS COUNTED % (AUTO): 100 %
UROBILINOGEN UR-MCNC: 2 MG/DL (ref ?–2)
WBC # BLD AUTO: 9.6 10^3/UL (ref 4–10.5)

## 2019-09-10 PROCEDURE — 36415 COLL VENOUS BLD VENIPUNCTURE: CPT

## 2019-09-10 PROCEDURE — 83690 ASSAY OF LIPASE: CPT

## 2019-09-10 PROCEDURE — 80053 COMPREHEN METABOLIC PANEL: CPT

## 2019-09-10 PROCEDURE — 81025 URINE PREGNANCY TEST: CPT

## 2019-09-10 PROCEDURE — 99284 EMERGENCY DEPT VISIT MOD MDM: CPT

## 2019-09-10 PROCEDURE — 85025 COMPLETE CBC W/AUTO DIFF WBC: CPT

## 2019-09-10 PROCEDURE — 81001 URINALYSIS AUTO W/SCOPE: CPT

## 2019-09-10 NOTE — ER DOCUMENT REPORT
ED GI/





- General


Chief Complaint: Abdominal Pain


Stated Complaint: RIB PAIN/DIFFICULTY BREATHING


Time Seen by Provider: 09/10/19 02:22


Notes: 





Patient is a 19-year-old female that comes emergency department for chief 

complaint of left upper quadrant pain that radiates around to her back.  She 

states she notices the pain when she is lying down, the pain is much worse when 

she is eating, she vomited earlier today.  She denies right upper quadrant pain,

lower abdominal pain, vaginal bleeding or discharge, dysuria, fever/chills.  She

denies any surgeries.  She recently had a work-up for her symptoms last month 

including a x-ray of the chest/abdomen and a CAT scan but these were all 

negative.  Patient denies blood in the vomit, hematochezia, or abnormal bowel 

movements.  When asked she admits to very high levels of caffeine, frequent sp

icy food, and she states that she stopped drinking heavy alcohol but she has 

done so somewhat recently.  She denies recreational drugs.  Significant other at

bedside.


TRAVEL OUTSIDE OF THE [place] IN LAST 30 DAYS: No





- Related Data


Allergies/Adverse Reactions: 


                                        





clonidine Allergy (Verified 09/10/19 02:20)


   











Past Medical History





- General


Information source: Patient





- Social History


Smoking Status: Never Smoker


Frequency of alcohol use: Social


Drug Abuse: None


Lives with: Family


Family History: Reviewed & Not Pertinent


Patient has suicidal ideation: No


Patient has homicidal ideation: No


Pulmonary Medical History: Reports: Hx Asthma


Renal/ Medical History: Denies: Hx Peritoneal Dialysis


Psychiatric Medical History: Reports: Hx Attention Deficit Hyperactivity 

Disorder, Hx Bipolar Disorder, Hx Depression


Surgical Hx: Negative





- Immunizations


Immunizations up to date: Yes


Hx Diphtheria, Pertussis, Tetanus Vaccination: Yes





Review of Systems





- Review of Systems


Constitutional: No symptoms reported


EENT: No symptoms reported


Cardiovascular: No symptoms reported


Respiratory: No symptoms reported


Gastrointestinal: See HPI


Genitourinary: No symptoms reported


Female Genitourinary: No symptoms reported


Musculoskeletal: No symptoms reported


Skin: No symptoms reported


Hematologic/Lymphatic: No symptoms reported


Neurological/Psychological: No symptoms reported





Physical Exam





- Vital signs


Vitals: 


                                        











Temp Pulse Resp BP Pulse Ox


 


 97.8 F   90   18   117/79   99 


 


 09/10/19 00:39  09/10/19 00:39  09/10/19 00:39  09/10/19 00:39  09/10/19 00:39














- Notes


Notes: 





GENERAL: Alert, interacts well. No acute distress.


HEAD: Normocephalic, atraumatic.


EYES: Pupils equal, round, and reactive to light. Extraocular movements intact.


ENT: Oral mucosa moist, tongue midline. Oropharynx unremarkable. Airway patent. 


LUNGS: Clear to auscultation bilaterally, no wheezes, rales, or rhonchi. No 

respiratory distress.


HEART: Regular rate and rhythm. No murmur


ABDOMEN: Patient is tender with wincing in the left upper quadrant and mildly 

tender in the epigastric area.  Right upper quadrant unremarkable, lower abdomen

unremarkable.  No guarding or rigidity.  Bowel sounds present.


EXTREMITIES: Moves all 4 extremities spontaneously. No edema, normal radial and 

dorsalis pedis pulses bilaterally. No cyanosis.


BACK: no cervical, thoracic, lumbar midline tenderness. No saddle anesthesia, 

normal distal neurovascular exam. Moves all extremities in full range of motion.


NEUROLOGICAL: Alert and oriented x3. Normal speech. Cranial nerves II through 

XII grossly intact. 


PSYCH: Normal affect, normal mood.


SKIN: Warm, dry, normal turgor. No rashes or lesions noted.





Course





- Re-evaluation


Re-evalutation: 


Patient is well-appearing on my exam.  She has left upper quadrant tenderness 

and mild epigastric tenderness on my evaluation.  She points to this area as the

area of concern as well.  No CVA tenderness.  Lungs clear, no hypoxia, 

unremarkable vital signs.  She recently had a full work-up including CAT scan 

which was unremarkable.  CBC, chemistry, lipase, urinalysis, urine pregnancy 

test unremarkable.





Based on patient's exam, work-up, previous work-up, and poor habits I strongly 

suspect gastritis as the cause of her symptoms.  I discussed the habits in 

detail, the work-up in detail, the recommendations, the follow-up, and the 

return precautions.  Patient states appreciation and agreement.  Patient stable 

at time of discharge.








- Vital Signs


Vital signs: 


                                        











Temp Pulse Resp BP Pulse Ox


 


 97.7 F   72   18   107/73   98 


 


 09/10/19 03:01  09/10/19 03:01  09/10/19 03:01  09/10/19 03:01  09/10/19 03:01














- Laboratory


Result Diagrams: 


                                 09/10/19 01:20





                                 09/10/19 01:20


Laboratory results interpreted by me: 


                                        











  09/10/19





  01:20


 


Urine Urobilinogen  2.0 H


 


Ur Leukocyte Esterase  SMALL H














Discharge





- Discharge


Clinical Impression: 


 Left upper quadrant pain





Condition: Stable


Disposition: HOME, SELF-CARE


Additional Instructions: 


Your symptoms and examination indicate gastritis (inflammation of your upper 

gastrointestinal tract).  Take Phenergan for nausea, take Carafate and Pepcid as

prescribed to help treat this, take Tylenol for pain, take the provided pain 

medication from here only for severe pain.  You can also take additional 

Rolaids, Tums, Maalox, etc. if needed.  Avoid NSAIDs, alcohol, smoking, 

caffeine, spicy food.  Start with clear fluids, progress to bland diet.  





Follow-up with primary care for additional evaluation and treatment including 

possible H. pylori testing or possibly endoscopy.  Return if you worsen 

including uncontrolled vomiting, vomiting blood, black stools, severe pain, 

fever of 100.4 or greater, or any other concerning or worsening symptoms.


Prescriptions: 


Sucralfate [Carafate 1 gm Tablet] 1 gm PO QID #20 tablet


Famotidine [Pepcid 20 mg Tablet] 20 mg PO BID #14 tablet


Promethazine HCl [Phenergan 25 mg Tablet] 25 mg PO Q6H PRN #15 tablet


 PRN Reason:

## 2019-09-16 ENCOUNTER — HOSPITAL ENCOUNTER (EMERGENCY)
Dept: HOSPITAL 62 - ER | Age: 19
Discharge: HOME | End: 2019-09-16
Payer: MEDICAID

## 2019-09-16 VITALS — SYSTOLIC BLOOD PRESSURE: 102 MMHG | DIASTOLIC BLOOD PRESSURE: 64 MMHG

## 2019-09-16 DIAGNOSIS — R10.9: ICD-10-CM

## 2019-09-16 DIAGNOSIS — N76.0: Primary | ICD-10-CM

## 2019-09-16 DIAGNOSIS — R11.10: ICD-10-CM

## 2019-09-16 DIAGNOSIS — R10.2: ICD-10-CM

## 2019-09-16 DIAGNOSIS — B96.89: ICD-10-CM

## 2019-09-16 DIAGNOSIS — N73.9: ICD-10-CM

## 2019-09-16 LAB
ADD MANUAL DIFF: NO
ALBUMIN SERPL-MCNC: 4.6 G/DL (ref 3.5–5)
ALP SERPL-CCNC: 76 U/L (ref 38–126)
ANION GAP SERPL CALC-SCNC: 10 MMOL/L (ref 5–19)
APPEARANCE UR: CLEAR
APTT PPP: YELLOW S
AST SERPL-CCNC: 22 U/L (ref 14–36)
BACTERIA (WET MOUNT): (no result)
BASOPHILS # BLD AUTO: 0 10^3/UL (ref 0–0.2)
BASOPHILS NFR BLD AUTO: 0.5 % (ref 0–2)
BILIRUB DIRECT SERPL-MCNC: 0.1 MG/DL (ref 0–0.4)
BILIRUB SERPL-MCNC: 0.3 MG/DL (ref 0.2–1.3)
BILIRUB UR QL STRIP: NEGATIVE
BUN SERPL-MCNC: 12 MG/DL (ref 7–20)
CALCIUM: 9.6 MG/DL (ref 8.4–10.2)
CHLAM PCR: DETECTED
CHLORIDE SERPL-SCNC: 102 MMOL/L (ref 98–107)
CO2 SERPL-SCNC: 26 MMOL/L (ref 22–30)
EOSINOPHIL # BLD AUTO: 0.3 10^3/UL (ref 0–0.6)
EOSINOPHIL NFR BLD AUTO: 4.1 % (ref 0–6)
EPITHELIALS (WET MOUNT): (no result)
ERYTHROCYTE [DISTWIDTH] IN BLOOD BY AUTOMATED COUNT: 13.5 % (ref 11.5–14)
GLUCOSE SERPL-MCNC: 120 MG/DL (ref 75–110)
GLUCOSE UR STRIP-MCNC: NEGATIVE MG/DL
HCT VFR BLD CALC: 38.4 % (ref 36–47)
HGB BLD-MCNC: 13.1 G/DL (ref 12–15.5)
KETONES UR STRIP-MCNC: NEGATIVE MG/DL
LYMPHOCYTES # BLD AUTO: 2.2 10^3/UL (ref 0.5–4.7)
LYMPHOCYTES NFR BLD AUTO: 31.3 % (ref 13–45)
MCH RBC QN AUTO: 29.9 PG (ref 27–33.4)
MCHC RBC AUTO-ENTMCNC: 34 G/DL (ref 32–36)
MCV RBC AUTO: 88 FL (ref 80–97)
MONOCYTES # BLD AUTO: 0.5 10^3/UL (ref 0.1–1.4)
MONOCYTES NFR BLD AUTO: 7.8 % (ref 3–13)
NEUTROPHILS # BLD AUTO: 3.9 10^3/UL (ref 1.7–8.2)
NEUTS SEG NFR BLD AUTO: 56.3 % (ref 42–78)
NITRITE UR QL STRIP: NEGATIVE
PH UR STRIP: 6 [PH] (ref 5–9)
PLATELET # BLD: 234 10^3/UL (ref 150–450)
POTASSIUM SERPL-SCNC: 3.6 MMOL/L (ref 3.6–5)
PROT SERPL-MCNC: 7.7 G/DL (ref 6.3–8.2)
PROT UR STRIP-MCNC: 30 MG/DL
RBC # BLD AUTO: 4.38 10^6/UL (ref 3.72–5.28)
SP GR UR STRIP: 1.03
T.VAGINALIS (WET MOUNT): (no result)
TOTAL CELLS COUNTED % (AUTO): 100 %
UROBILINOGEN UR-MCNC: NEGATIVE MG/DL (ref ?–2)
WBC # BLD AUTO: 6.9 10^3/UL (ref 4–10.5)
WBCS (WET MOUNT): (no result)
YEAST (WET MOUNT): (no result)

## 2019-09-16 PROCEDURE — 83690 ASSAY OF LIPASE: CPT

## 2019-09-16 PROCEDURE — 87491 CHLMYD TRACH DNA AMP PROBE: CPT

## 2019-09-16 PROCEDURE — 81025 URINE PREGNANCY TEST: CPT

## 2019-09-16 PROCEDURE — 96361 HYDRATE IV INFUSION ADD-ON: CPT

## 2019-09-16 PROCEDURE — 87591 N.GONORRHOEAE DNA AMP PROB: CPT

## 2019-09-16 PROCEDURE — 85025 COMPLETE CBC W/AUTO DIFF WBC: CPT

## 2019-09-16 PROCEDURE — 96374 THER/PROPH/DIAG INJ IV PUSH: CPT

## 2019-09-16 PROCEDURE — 87210 SMEAR WET MOUNT SALINE/INK: CPT

## 2019-09-16 PROCEDURE — 81001 URINALYSIS AUTO W/SCOPE: CPT

## 2019-09-16 PROCEDURE — 80053 COMPREHEN METABOLIC PANEL: CPT

## 2019-09-16 PROCEDURE — 99284 EMERGENCY DEPT VISIT MOD MDM: CPT

## 2019-09-16 PROCEDURE — 76830 TRANSVAGINAL US NON-OB: CPT

## 2019-09-16 PROCEDURE — 36415 COLL VENOUS BLD VENIPUNCTURE: CPT

## 2019-09-16 PROCEDURE — 93976 VASCULAR STUDY: CPT

## 2019-09-16 NOTE — RADIOLOGY REPORT (SQ)
EXAM DESCRIPTION:

US PELVIS TRANSVAGINAL



COMPLETED DATE/TME:  09/16/2019 04:57



CLINICAL HISTORY:

19 years Female, pelvic pn



Comparison:Aug 08 2019, CT

Technique: Transvaginal.

LIMITATIONS: None.



FINDINGS:



8-cm uterus, 1.2-cm endometrial stripe thickness, 3.7-cm right

ovary, and 3.0-cm left ovary appear normal in size, shape,

echotexture, and vascularity. No free fluid.



IMPRESSION:



Normal pelvic sonogram.

## 2019-09-16 NOTE — ER DOCUMENT REPORT
ED GI/





- General


Chief Complaint: Abdominal Pain


Stated Complaint: ABDOMINAL PAIN


Time Seen by Provider: 09/16/19 03:59


Notes: 





Patient is a 19-year-old female presents to the emergency department for 

generalized pelvic pain.  Patient's complaining of vomiting x1 denies fevers or 

diarrhea.  Patient states she does have a malodorous vaginal discharge but is 

denying any dysuria.


Patient's denying any back pain.





Past medical history: Asthma, ADHD, anxiety, depression, bipolar


Medications: None


Allergies: Clonidine


Last menstrual cycle 2 weeks ago.


TRAVEL OUTSIDE OF THE [place] IN LAST 30 DAYS: No





- Related Data


Allergies/Adverse Reactions: 


                                        





clonidine Allergy (Verified 09/16/19 03:59)


   











Past Medical History





- General


Information source: Patient





- Social History


Smoking Status: Never Smoker


Frequency of alcohol use: None


Drug Abuse: None


Family History: Reviewed & Not Pertinent


Patient has suicidal ideation: No


Patient has homicidal ideation: No


Pulmonary Medical History: Reports: Hx Asthma


Renal/ Medical History: Denies: Hx Peritoneal Dialysis


Psychiatric Medical History: Reports: Hx Attention Deficit Hyperactivity 

Disorder, Hx Bipolar Disorder, Hx Depression





- Immunizations


Immunizations up to date: Yes


Hx Diphtheria, Pertussis, Tetanus Vaccination: Yes





Review of Systems





- Review of Systems


Constitutional: denies: Fever


EENT: No symptoms reported


Cardiovascular: No symptoms reported


Respiratory: No symptoms reported


Gastrointestinal: See HPI


Genitourinary: See HPI


Female Genitourinary: See HPI


Musculoskeletal: No symptoms reported


Skin: No symptoms reported


Hematologic/Lymphatic: No symptoms reported


Neurological/Psychological: No symptoms reported





Physical Exam





- Vital signs


Vitals: 


                                        











Temp Pulse Resp BP Pulse Ox


 


 97.7 F   97   16   114/78   97 


 


 09/16/19 03:50  09/16/19 03:50  09/16/19 03:50  09/16/19 03:50  09/16/19 03:50














- Notes


Notes: 





GENERAL: Alert, interacts well. No acute distress.


HEAD: Normocephalic, atraumatic.


EYES: Pupils equal, round, and reactive to light. Extraocular movements intact.


ENT: Oral mucosa moist, tongue midline. 


NECK: Full range of motion. Supple. Trachea midline.


LUNGS: Clear to auscultation bilaterally, no wheezes, rales, or rhonchi. No 

respiratory distress.


HEART: Regular rate and rhythm. No murmur


ABDOMEN: Soft, no McBurney's point tenderness, no Guzman sign noted.  Non-

distended. Bowel sounds present in all 4 quadrants.  Generalized bilateral 

pelvic pain noted.


EXTREMITIES: Moves all 4 extremities spontaneously. No edema, normal radial and 

dorsalis pedis pulses bilaterally. No cyanosis.


BACK: no cervical, thoracic, lumbar midline tenderness. No saddle anesthesia, 

normal distal neurovascular exam.  No CVA tenderness noted bilaterally.


NEUROLOGICAL: Alert and oriented x3. Normal speech. cranial nerves II through 

XII grossly intact 


PSYCH: Normal affect, normal mood.


SKIN: Warm, dry, normal turgor. No rashes or lesions noted.


Genitalia: Chaperone [place] RN, malodorous white discharge noted in the 

cul-de-sac, positive cervical motion tenderness, slight adnexal tenderness noted

bilaterally.





Course





- Re-evaluation


Re-evalutation: 





09/16/19 06:02





Laboratory











  09/16/19 09/16/19 09/16/19





  04:15 04:15 04:15


 


WBC  6.9  


 


RBC  4.38  


 


Hgb  13.1  


 


Hct  38.4  


 


MCV  88  


 


MCH  29.9  


 


MCHC  34.0  


 


RDW  13.5  


 


Plt Count  234  


 


Lymph % (Auto)  31.3  


 


Mono % (Auto)  7.8  


 


Eos % (Auto)  4.1  


 


Baso % (Auto)  0.5  


 


Absolute Neuts (auto)  3.9  


 


Absolute Lymphs (auto)  2.2  


 


Absolute Monos (auto)  0.5  


 


Absolute Eos (auto)  0.3  


 


Absolute Basos (auto)  0.0  


 


Seg Neutrophils %  56.3  


 


Sodium   138.2 


 


Potassium   3.6 


 


Chloride   102 


 


Carbon Dioxide   26 


 


Anion Gap   10 


 


BUN   12 


 


Creatinine   0.73 


 


Est GFR ( Amer)   > 60 


 


Est GFR (MDRD) Non-Af   > 60 


 


Glucose   120 H 


 


Calcium   9.6 


 


Total Bilirubin   0.3 


 


Direct Bilirubin   0.1 


 


Neonat Total Bilirubin   Not Reportable 


 


Neonat Direct Bilirubin   Not Reportable 


 


Neonat Indirect Bili   Not Reportable 


 


AST   22 


 


ALT   12 


 


Alkaline Phosphatase   76 


 


Total Protein   7.7 


 


Albumin   4.6 


 


Lipase   99.5 


 


Urine Color    YELLOW


 


Urine Appearance    CLEAR


 


Urine pH    6.0


 


Ur Specific Gravity    1.028


 


Urine Protein    30 H


 


Urine Glucose (UA)    NEGATIVE


 


Urine Ketones    NEGATIVE


 


Urine Blood    NEGATIVE


 


Urine Nitrite    NEGATIVE


 


Urine Bilirubin    NEGATIVE


 


Urine Urobilinogen    NEGATIVE


 


Ur Leukocyte Esterase    LARGE H


 


Urine WBC (Auto)    10


 


Urine RBC (Auto)    3


 


Urine Bacteria (Auto)    TRACE


 


Squamous Epi Cells Auto    10


 


Urine Mucus (Auto)    FEW


 


Urine Ascorbic Acid    NEGATIVE


 


Urine HCG, Qual   


 


Epi Cells (Wet Prep)   


 


Bacteria (Wet Prep)   


 


Trichomonas (Wet Prep)   


 


Vaginal WBC   


 


Vaginal Yeast   














  09/16/19 09/16/19





  04:15 04:36


 


WBC  


 


RBC  


 


Hgb  


 


Hct  


 


MCV  


 


MCH  


 


MCHC  


 


RDW  


 


Plt Count  


 


Lymph % (Auto)  


 


Mono % (Auto)  


 


Eos % (Auto)  


 


Baso % (Auto)  


 


Absolute Neuts (auto)  


 


Absolute Lymphs (auto)  


 


Absolute Monos (auto)  


 


Absolute Eos (auto)  


 


Absolute Basos (auto)  


 


Seg Neutrophils %  


 


Sodium  


 


Potassium  


 


Chloride  


 


Carbon Dioxide  


 


Anion Gap  


 


BUN  


 


Creatinine  


 


Est GFR ( Amer)  


 


Est GFR (MDRD) Non-Af  


 


Glucose  


 


Calcium  


 


Total Bilirubin  


 


Direct Bilirubin  


 


Neonat Total Bilirubin  


 


Neonat Direct Bilirubin  


 


Neonat Indirect Bili  


 


AST  


 


ALT  


 


Alkaline Phosphatase  


 


Total Protein  


 


Albumin  


 


Lipase  


 


Urine Color  


 


Urine Appearance  


 


Urine pH  


 


Ur Specific Gravity  


 


Urine Protein  


 


Urine Glucose (UA)  


 


Urine Ketones  


 


Urine Blood  


 


Urine Nitrite  


 


Urine Bilirubin  


 


Urine Urobilinogen  


 


Ur Leukocyte Esterase  


 


Urine WBC (Auto)  


 


Urine RBC (Auto)  


 


Urine Bacteria (Auto)  


 


Squamous Epi Cells Auto  


 


Urine Mucus (Auto)  


 


Urine Ascorbic Acid  


 


Urine HCG, Qual  NEGATIVE 


 


Epi Cells (Wet Prep)   3+ EPITHELIALS SEEN


 


Bacteria (Wet Prep)   3+ BACTERIA SEEN


 


Trichomonas (Wet Prep)   NO TRICHOMONAS SEEN


 


Vaginal WBC   FEW WBCS SEEN


 


Vaginal Yeast   NO YEAST SEEN











                                        





Transvaginal US  09/16/19 04:57


IMPRESSION:


 


Normal pelvic sonogram.


 








Patient's wet mount shows signs of bacterial vaginosis.  Based on patient's 

physical exam with cervical motion tenderness and malodorous vaginal discharge I

will treat for pelvic inflammatory disease.  Patient's gonorrhea and Chlamydia 

testing are still pending.  She was prophylactically treated with ceftriaxone 

and azithromycin in the emergency room.


Patient has had no further episodes of vomiting after Zofran administration.  

Has been able to drink water with no difficulty.  States she overall feels a lot

better.





At this time will discharge with return precautions and follow-up recom

mendations.  Verbal discharge instructions given a the bedside and opportunity 

for questions given. Medication warnings reviewed. Patient is in agreement with 

this plan and has verbalized understanding of return precautions and the need 

for primary care follow-up in the next 24-72 hours.





This medical record was dictated with voice recognizing software.  There may be 

grammatical, syntax errors that are unintended.





- Vital Signs


Vital signs: 


                                        











Temp Pulse Resp BP Pulse Ox


 


 97.7 F   97   16   114/78   97 


 


 09/16/19 03:50  09/16/19 03:50  09/16/19 03:50  09/16/19 03:50  09/16/19 03:50














- Laboratory


Result Diagrams: 


                                 09/16/19 04:15





                                 09/16/19 04:15


Laboratory results interpreted by me: 


                                        











  09/16/19 09/16/19





  04:15 04:15


 


Glucose  120 H 


 


Urine Protein   30 H


 


Ur Leukocyte Esterase   LARGE H














Discharge





- Discharge


Clinical Impression: 


 Bacterial vaginosis, Pelvic inflammatory disease, Pelvic pain





Condition: Stable


Disposition: HOME, SELF-CARE


Instructions:  Pelvic Inflammatory Disease ([facility]), Vaginosis, Bacterial ([facility])


Additional Instructions: 


You has been seen and treated in the emergency department for a pelvic 

infection.  Please take antibiotics as prescribed.  Please also refrain from 

sexual activity while on antibiotics.  Please follow-up with your primary care 

provider next 24 to 48 hours.  Return to the emergency room for any further 

concerns.


Prescriptions: 


Doxycycline Hyclate 100 mg PO BID 14 Days  capsule


Metronidazole [Flagyl 500 mg Tablet] 500 mg PO BID 14 Days  tablet


Ondansetron [Zofran Odt 4 mg Tablet] 1 tab PO Q6 PRN #10 tab.rapdis


 PRN Reason: For Nausea/Vomiting

## 2020-10-01 ENCOUNTER — HOSPITAL ENCOUNTER (OUTPATIENT)
Dept: HOSPITAL 62 - LC | Age: 20
Discharge: HOME | End: 2020-10-01
Attending: OBSTETRICS & GYNECOLOGY
Payer: MEDICAID

## 2020-10-01 DIAGNOSIS — Z34.03: Primary | ICD-10-CM

## 2020-10-01 DIAGNOSIS — Z88.8: ICD-10-CM

## 2020-10-01 DIAGNOSIS — Z3A.36: ICD-10-CM

## 2020-10-01 PROCEDURE — 84112 EVAL AMNIOTIC FLUID PROTEIN: CPT

## 2020-10-01 NOTE — NON STRESS TEST REPORT
=================================================================

Non Stress Test

=================================================================

Datetime Report Generated by CPN: 10/01/2020 22:31

   

   

=================================================================

DEMOGRAPHIC

=================================================================

   

EGA NST:  36.6

   

=================================================================

INDICATION

=================================================================

   

Indication for Study (NST) Other:  lc for mucus plug

   

=================================================================

VITAL SIGNS

=================================================================

   

Temperature - NST:  98.7

RESP - NST:  16

   

=================================================================

MONITORING

=================================================================

   

Monitor Explained:  Monitor Explained; Test Explained; Patient

   Verbalized Understanding

Time on Monitor:  10/01/2020 21:40

Time off Monitor:  10/01/2020 22:23

NST Duration:  43

   

=================================================================

NST INTERVENTIONS

=================================================================

   

NST Interventions:  Reposition Patient

Physician Notified NST:  Dr Morales

BABY A:  [id number]

   

=================================================================

BABY A

=================================================================

   

Fetal Movement :  Present

Contraction Frequency :  irreg

FHR Baseline :  140

Accelerations :  15X15

Decelerations :  None

Variability :  Moderate 6-25bpm

NST Review:  Meets Criteria for Reactive NST

NST Review and Verified By :  SONYA Flores RN

NST Results:  Reactive

   

=================================================================

NST REPORT

=================================================================

   

Report Trigger:  Send Report